# Patient Record
Sex: FEMALE | Race: WHITE
[De-identification: names, ages, dates, MRNs, and addresses within clinical notes are randomized per-mention and may not be internally consistent; named-entity substitution may affect disease eponyms.]

---

## 2019-08-19 ENCOUNTER — HOSPITAL ENCOUNTER (OUTPATIENT)
Age: 49
End: 2019-08-19
Payer: COMMERCIAL

## 2019-08-19 DIAGNOSIS — M47.896: Primary | ICD-10-CM

## 2019-08-19 DIAGNOSIS — M51.36: ICD-10-CM

## 2019-08-19 DIAGNOSIS — M48.061: ICD-10-CM

## 2019-08-19 PROCEDURE — 72110 X-RAY EXAM L-2 SPINE 4/>VWS: CPT

## 2019-09-25 ENCOUNTER — HOSPITAL ENCOUNTER (OUTPATIENT)
Age: 49
End: 2019-09-25
Payer: COMMERCIAL

## 2019-09-25 DIAGNOSIS — R13.10: Primary | ICD-10-CM

## 2019-09-25 PROCEDURE — 74220 X-RAY XM ESOPHAGUS 1CNTRST: CPT

## 2019-10-05 ENCOUNTER — HOSPITAL ENCOUNTER (EMERGENCY)
Dept: HOSPITAL 100 - ED | Age: 49
LOS: 1 days | Discharge: HOME | End: 2019-10-06
Payer: COMMERCIAL

## 2019-10-05 VITALS
HEART RATE: 95 BPM | TEMPERATURE: 98.06 F | OXYGEN SATURATION: 97 % | DIASTOLIC BLOOD PRESSURE: 101 MMHG | RESPIRATION RATE: 15 BRPM | SYSTOLIC BLOOD PRESSURE: 152 MMHG

## 2019-10-05 VITALS — BODY MASS INDEX: 25.5 KG/M2

## 2019-10-05 DIAGNOSIS — H16.001: Primary | ICD-10-CM

## 2019-10-05 PROCEDURE — 70450 CT HEAD/BRAIN W/O DYE: CPT

## 2019-10-05 PROCEDURE — 99282 EMERGENCY DEPT VISIT SF MDM: CPT

## 2019-10-05 RX ADMIN — TETRACAINE HYDROCHLORIDE 1 DRP: 5 SOLUTION OPHTHALMIC at 23:20

## 2019-10-06 VITALS — RESPIRATION RATE: 14 BRPM

## 2019-10-06 RX ADMIN — HYDROCODONE BITARTRATE AND ACETAMINOPHEN 1 TABLET: 5; 325 TABLET ORAL at 00:04

## 2019-10-07 ENCOUNTER — HOSPITAL ENCOUNTER (OUTPATIENT)
Age: 49
End: 2019-10-07
Payer: COMMERCIAL

## 2019-10-07 VITALS — BODY MASS INDEX: 25.5 KG/M2

## 2019-10-07 DIAGNOSIS — R68.89: ICD-10-CM

## 2019-10-07 DIAGNOSIS — I10: Primary | ICD-10-CM

## 2019-10-07 LAB
ALANINE AMINOTRANSFER ALT/SGPT: 28 U/L (ref 13–56)
ALBUMIN SERPL-MCNC: 4.5 G/DL (ref 3.2–5)
ALKALINE PHOSPHATASE: 65 U/L (ref 45–117)
ANION GAP: 8 (ref 5–15)
AST(SGOT): 15 U/L (ref 15–37)
BUN SERPL-MCNC: 18 MG/DL (ref 7–18)
BUN/CREAT RATIO: 25.7 RATIO (ref 10–20)
CALCIUM SERPL-MCNC: 9.3 MG/DL (ref 8.5–10.1)
CARBON DIOXIDE: 29 MMOL/L (ref 21–32)
CHLORIDE: 103 MMOL/L (ref 98–107)
CHOLEST SERPL-MCNC: 250 MG/DL
EST GLOM FILT RATE - AFR AMER: 113 ML/MIN (ref 60–?)
GLOBULIN: 3.8 G/DL (ref 2.2–4.2)
GLUCOSE: 87 MG/DL (ref 74–106)
POTASSIUM: 4.7 MMOL/L (ref 3.5–5.1)
TRIGLYCERIDES: 82 MG/DL
VLDLC SERPL-MCNC: 16 MG/DL (ref 5–40)

## 2019-10-07 PROCEDURE — 36415 COLL VENOUS BLD VENIPUNCTURE: CPT

## 2019-10-07 PROCEDURE — 80061 LIPID PANEL: CPT

## 2019-10-07 PROCEDURE — 84443 ASSAY THYROID STIM HORMONE: CPT

## 2019-10-07 PROCEDURE — 80053 COMPREHEN METABOLIC PANEL: CPT

## 2019-10-25 ENCOUNTER — HOSPITAL ENCOUNTER (OUTPATIENT)
Dept: HOSPITAL 100 - PT | Age: 49
Discharge: HOME | End: 2019-10-25
Payer: COMMERCIAL

## 2019-10-25 VITALS — BODY MASS INDEX: 25.5 KG/M2

## 2019-10-25 DIAGNOSIS — M54.5: Primary | ICD-10-CM

## 2019-10-25 PROCEDURE — G0283 ELEC STIM OTHER THAN WOUND: HCPCS

## 2019-10-25 PROCEDURE — 97014 ELECTRIC STIMULATION THERAPY: CPT

## 2019-10-25 PROCEDURE — 97162 PT EVAL MOD COMPLEX 30 MIN: CPT

## 2019-10-25 PROCEDURE — 97110 THERAPEUTIC EXERCISES: CPT

## 2019-10-25 PROCEDURE — 97035 APP MDLTY 1+ULTRASOUND EA 15: CPT

## 2019-11-25 ENCOUNTER — HOSPITAL ENCOUNTER (OUTPATIENT)
Age: 49
End: 2019-11-25
Payer: COMMERCIAL

## 2019-11-25 VITALS — BODY MASS INDEX: 25.5 KG/M2

## 2019-11-25 DIAGNOSIS — M25.552: ICD-10-CM

## 2019-11-25 DIAGNOSIS — I10: Primary | ICD-10-CM

## 2019-11-25 LAB
ANION GAP: 5 (ref 5–15)
BUN SERPL-MCNC: 12 MG/DL (ref 7–18)
BUN/CREAT RATIO: 16.5 RATIO (ref 10–20)
CALCIUM SERPL-MCNC: 8.9 MG/DL (ref 8.5–10.1)
CARBON DIOXIDE: 31 MMOL/L (ref 21–32)
CHLORIDE: 101 MMOL/L (ref 98–107)
CHOLEST SERPL-MCNC: 223 MG/DL
EST GLOM FILT RATE - AFR AMER: 109 ML/MIN (ref 60–?)
GLUCOSE: 81 MG/DL (ref 74–106)
POTASSIUM: 3.9 MMOL/L (ref 3.5–5.1)
TRIGLYCERIDES: 98 MG/DL
VLDLC SERPL-MCNC: 20 MG/DL (ref 5–40)

## 2019-11-25 PROCEDURE — 80048 BASIC METABOLIC PNL TOTAL CA: CPT

## 2019-11-25 PROCEDURE — 36415 COLL VENOUS BLD VENIPUNCTURE: CPT

## 2019-11-25 PROCEDURE — 80061 LIPID PANEL: CPT

## 2019-11-25 PROCEDURE — 73521 X-RAY EXAM HIPS BI 2 VIEWS: CPT

## 2019-12-13 ENCOUNTER — HOSPITAL ENCOUNTER (OUTPATIENT)
Age: 49
End: 2019-12-13
Payer: COMMERCIAL

## 2019-12-13 VITALS — BODY MASS INDEX: 25.5 KG/M2

## 2019-12-13 DIAGNOSIS — M70.62: Primary | ICD-10-CM

## 2019-12-13 PROCEDURE — 73721 MRI JNT OF LWR EXTRE W/O DYE: CPT

## 2019-12-30 ENCOUNTER — HOSPITAL ENCOUNTER (OUTPATIENT)
Dept: HOSPITAL 100 - PT | Age: 49
Discharge: HOME | End: 2019-12-30
Payer: COMMERCIAL

## 2019-12-30 VITALS — BODY MASS INDEX: 25.5 KG/M2

## 2019-12-30 DIAGNOSIS — M76.12: ICD-10-CM

## 2019-12-30 DIAGNOSIS — M70.62: Primary | ICD-10-CM

## 2019-12-30 PROCEDURE — G0283 ELEC STIM OTHER THAN WOUND: HCPCS

## 2019-12-30 PROCEDURE — 97162 PT EVAL MOD COMPLEX 30 MIN: CPT

## 2019-12-30 PROCEDURE — 97530 THERAPEUTIC ACTIVITIES: CPT

## 2019-12-30 PROCEDURE — 97110 THERAPEUTIC EXERCISES: CPT

## 2019-12-30 PROCEDURE — 97014 ELECTRIC STIMULATION THERAPY: CPT

## 2020-02-19 ENCOUNTER — HOSPITAL ENCOUNTER (OUTPATIENT)
Age: 50
End: 2020-02-19
Payer: COMMERCIAL

## 2020-02-19 VITALS — BODY MASS INDEX: 25.5 KG/M2

## 2020-02-19 DIAGNOSIS — E55.9: ICD-10-CM

## 2020-02-19 DIAGNOSIS — I10: Primary | ICD-10-CM

## 2020-02-19 LAB
ANION GAP: 6 (ref 5–15)
BUN SERPL-MCNC: 14 MG/DL (ref 7–18)
BUN/CREAT RATIO: 17.5 RATIO (ref 10–20)
CALCIUM SERPL-MCNC: 9.7 MG/DL (ref 8.5–10.1)
CARBON DIOXIDE: 28 MMOL/L (ref 21–32)
CHLORIDE: 103 MMOL/L (ref 98–107)
CHOLEST SERPL-MCNC: 257 MG/DL
EST GLOM FILT RATE - AFR AMER: 97 ML/MIN (ref 60–?)
GLUCOSE: 88 MG/DL (ref 74–106)
POTASSIUM: 3.7 MMOL/L (ref 3.5–5.1)
TRIGLYCERIDES: 231 MG/DL
VITAMIN D,25 HYDROXY: 33.9 NG/ML (ref 29.95–100.01)
VLDLC SERPL-MCNC: 46 MG/DL (ref 5–40)

## 2020-02-19 PROCEDURE — 80048 BASIC METABOLIC PNL TOTAL CA: CPT

## 2020-02-19 PROCEDURE — 80061 LIPID PANEL: CPT

## 2020-02-19 PROCEDURE — 36415 COLL VENOUS BLD VENIPUNCTURE: CPT

## 2020-02-19 PROCEDURE — 82306 VITAMIN D 25 HYDROXY: CPT

## 2020-02-25 ENCOUNTER — HOSPITAL ENCOUNTER (OUTPATIENT)
Age: 50
End: 2020-02-25
Payer: COMMERCIAL

## 2020-02-25 VITALS — BODY MASS INDEX: 25.5 KG/M2

## 2020-02-25 DIAGNOSIS — Z12.31: Primary | ICD-10-CM

## 2020-02-25 PROCEDURE — 77067 SCR MAMMO BI INCL CAD: CPT

## 2020-02-26 ENCOUNTER — HOSPITAL ENCOUNTER (OUTPATIENT)
Dept: HOSPITAL 100 - LABSPEC | Age: 50
Discharge: HOME | End: 2020-02-26
Payer: COMMERCIAL

## 2020-02-26 VITALS — BODY MASS INDEX: 25.5 KG/M2

## 2020-02-26 DIAGNOSIS — Z01.419: Primary | ICD-10-CM

## 2020-02-26 PROCEDURE — 88175 CYTOPATH C/V AUTO FLUID REDO: CPT

## 2020-02-26 PROCEDURE — G0145 SCR C/V CYTO,THINLAYER,RESCR: HCPCS

## 2020-07-07 ENCOUNTER — HOSPITAL ENCOUNTER (OUTPATIENT)
Age: 50
End: 2020-07-07
Payer: COMMERCIAL

## 2020-07-07 VITALS — BODY MASS INDEX: 25.5 KG/M2

## 2020-07-07 DIAGNOSIS — M51.36: Primary | ICD-10-CM

## 2020-07-07 PROCEDURE — 72148 MRI LUMBAR SPINE W/O DYE: CPT

## 2021-04-30 ENCOUNTER — HOSPITAL ENCOUNTER (OUTPATIENT)
Age: 51
End: 2021-04-30
Payer: COMMERCIAL

## 2021-04-30 VITALS — BODY MASS INDEX: 25.5 KG/M2

## 2021-04-30 DIAGNOSIS — Z00.00: Primary | ICD-10-CM

## 2021-04-30 LAB
ANION GAP: 5 (ref 5–15)
BUN SERPL-MCNC: 12 MG/DL (ref 7–18)
BUN/CREAT RATIO: 17.8 RATIO (ref 10–20)
CALCIUM SERPL-MCNC: 9.8 MG/DL (ref 8.5–10.1)
CARBON DIOXIDE: 29 MMOL/L (ref 21–32)
CHLORIDE: 104 MMOL/L (ref 98–107)
EST GLOM FILT RATE - AFR AMER: 117 ML/MIN (ref 60–?)
GLUCOSE: 87 MG/DL (ref 74–106)
POTASSIUM: 4.3 MMOL/L (ref 3.5–5.1)
VITAMIN D,25 HYDROXY: 33.7 NG/ML

## 2021-04-30 PROCEDURE — 36415 COLL VENOUS BLD VENIPUNCTURE: CPT

## 2021-04-30 PROCEDURE — 80048 BASIC METABOLIC PNL TOTAL CA: CPT

## 2021-04-30 PROCEDURE — 82306 VITAMIN D 25 HYDROXY: CPT

## 2021-04-30 PROCEDURE — 84443 ASSAY THYROID STIM HORMONE: CPT

## 2021-12-08 ENCOUNTER — HOSPITAL ENCOUNTER (OUTPATIENT)
Age: 51
End: 2021-12-08
Payer: COMMERCIAL

## 2021-12-08 DIAGNOSIS — Z01.810: Primary | ICD-10-CM

## 2021-12-08 PROCEDURE — 71046 X-RAY EXAM CHEST 2 VIEWS: CPT

## 2022-01-04 ENCOUNTER — HOSPITAL ENCOUNTER (OUTPATIENT)
Dept: HOSPITAL 100 - LABSPEC | Age: 52
Discharge: ANOTHER HOSPITAL | End: 2022-01-04
Payer: COMMERCIAL

## 2022-01-04 DIAGNOSIS — Z11.52: Primary | ICD-10-CM

## 2022-01-04 PROCEDURE — U0005 INFEC AGEN DETEC AMPLI PROBE: HCPCS

## 2022-01-04 PROCEDURE — 87635 SARS-COV-2 COVID-19 AMP PRB: CPT

## 2022-01-04 PROCEDURE — U0003 INFECTIOUS AGENT DETECTION BY NUCLEIC ACID (DNA OR RNA); SEVERE ACUTE RESPIRATORY SYNDROME CORONAVIRUS 2 (SARS-COV-2) (CORONAVIRUS DISEASE [COVID-19]), AMPLIFIED PROBE TECHNIQUE, MAKING USE OF HIGH THROUGHPUT TECHNOLOGIES AS DESCRIBED BY CMS-2020-01-R: HCPCS

## 2022-05-10 ENCOUNTER — HOSPITAL ENCOUNTER (OUTPATIENT)
Dept: HOSPITAL 100 - MTLAB | Age: 52
Discharge: HOME | End: 2022-05-10
Payer: COMMERCIAL

## 2022-05-10 DIAGNOSIS — Z00.00: Primary | ICD-10-CM

## 2022-05-10 LAB
ANION GAP: 9 (ref 5–15)
BUN SERPL-MCNC: 16 MG/DL (ref 7–18)
BUN/CREAT RATIO: 25.4 RATIO (ref 10–20)
CALCIUM SERPL-MCNC: 9.2 MG/DL (ref 8.5–10.1)
CARBON DIOXIDE: 28 MMOL/L (ref 21–32)
CHLORIDE: 102 MMOL/L (ref 98–107)
CHOLEST SERPL-MCNC: 275 MG/DL
EST GLOM FILT RATE - AFR AMER: 127 ML/MIN (ref 60–?)
GLUCOSE: 90 MG/DL (ref 74–106)
POTASSIUM: 4 MMOL/L (ref 3.5–5.1)
TRIGLYCERIDES: 127 MG/DL
VITAMIN D,25 HYDROXY: 37.6 NG/ML
VLDLC SERPL-MCNC: 25 MG/DL (ref 5–40)

## 2022-05-10 PROCEDURE — 80061 LIPID PANEL: CPT

## 2022-05-10 PROCEDURE — 82306 VITAMIN D 25 HYDROXY: CPT

## 2022-05-10 PROCEDURE — 80048 BASIC METABOLIC PNL TOTAL CA: CPT

## 2022-05-10 PROCEDURE — 36415 COLL VENOUS BLD VENIPUNCTURE: CPT

## 2022-05-17 ENCOUNTER — HOSPITAL ENCOUNTER (OUTPATIENT)
Dept: HOSPITAL 100 - OPBI | Age: 52
Discharge: HOME | End: 2022-05-17
Payer: COMMERCIAL

## 2022-05-17 DIAGNOSIS — Z12.31: Primary | ICD-10-CM

## 2022-05-17 PROCEDURE — 77063 BREAST TOMOSYNTHESIS BI: CPT

## 2022-05-17 PROCEDURE — 77067 SCR MAMMO BI INCL CAD: CPT

## 2022-09-13 ENCOUNTER — HOSPITAL ENCOUNTER (OUTPATIENT)
Dept: HOSPITAL 100 - MTRAD | Age: 52
Discharge: HOME | End: 2022-09-13
Payer: COMMERCIAL

## 2022-09-13 DIAGNOSIS — M25.551: Primary | ICD-10-CM

## 2022-09-13 PROCEDURE — 73502 X-RAY EXAM HIP UNI 2-3 VIEWS: CPT

## 2023-03-14 ENCOUNTER — HOSPITAL ENCOUNTER (OUTPATIENT)
Dept: HOSPITAL 100 - MTLAB | Age: 53
Discharge: HOME | End: 2023-03-14
Payer: COMMERCIAL

## 2023-03-14 DIAGNOSIS — N95.1: ICD-10-CM

## 2023-03-14 DIAGNOSIS — I10: Primary | ICD-10-CM

## 2023-03-14 DIAGNOSIS — R63.5: ICD-10-CM

## 2023-03-14 LAB
ANION GAP: 8 (ref 5–15)
BUN SERPL-MCNC: 15 MG/DL (ref 7–18)
BUN/CREAT RATIO: 20.1 RATIO (ref 10–20)
CALCIUM SERPL-MCNC: 9.9 MG/DL (ref 8.5–10.1)
CARBON DIOXIDE: 28 MMOL/L (ref 21–32)
CHLORIDE: 101 MMOL/L (ref 98–107)
CHOLEST SERPL-MCNC: 205 MG/DL
EST GLOM FILT RATE - AFR AMER: 104 ML/MIN (ref 60–?)
FOLLICLE STIMULATING HORMONE: 61.8 MIU/ML
GLUCOSE: 100 MG/DL (ref 74–106)
POTASSIUM: 3.8 MMOL/L (ref 3.5–5.1)
TRIGLYCERIDES: 151 MG/DL
VLDLC SERPL-MCNC: 30 MG/DL (ref 5–40)

## 2023-03-14 PROCEDURE — 80061 LIPID PANEL: CPT

## 2023-03-14 PROCEDURE — 36415 COLL VENOUS BLD VENIPUNCTURE: CPT

## 2023-03-14 PROCEDURE — 84443 ASSAY THYROID STIM HORMONE: CPT

## 2023-03-14 PROCEDURE — 83002 ASSAY OF GONADOTROPIN (LH): CPT

## 2023-03-14 PROCEDURE — 83001 ASSAY OF GONADOTROPIN (FSH): CPT

## 2023-03-14 PROCEDURE — 80048 BASIC METABOLIC PNL TOTAL CA: CPT

## 2023-03-14 PROCEDURE — 82672 ASSAY OF ESTROGEN: CPT

## 2023-03-19 LAB — ESTROGEN SERPL-MCNC: 57 PG/ML (ref 40–244)

## 2023-04-10 ENCOUNTER — HOSPITAL ENCOUNTER (OUTPATIENT)
Age: 53
Discharge: HOME | End: 2023-04-10
Payer: COMMERCIAL

## 2023-04-10 DIAGNOSIS — M25.551: Primary | ICD-10-CM

## 2023-04-10 LAB — CRP SERPL-MCNC: < 2.9 MG/L (ref 0–3)

## 2023-04-10 PROCEDURE — 86038 ANTINUCLEAR ANTIBODIES: CPT

## 2023-04-10 PROCEDURE — 73502 X-RAY EXAM HIP UNI 2-3 VIEWS: CPT

## 2023-04-10 PROCEDURE — 85652 RBC SED RATE AUTOMATED: CPT

## 2023-04-10 PROCEDURE — 86431 RHEUMATOID FACTOR QUANT: CPT

## 2023-04-10 PROCEDURE — 86140 C-REACTIVE PROTEIN: CPT

## 2023-04-10 PROCEDURE — 86618 LYME DISEASE ANTIBODY: CPT

## 2023-04-10 PROCEDURE — 36415 COLL VENOUS BLD VENIPUNCTURE: CPT

## 2023-12-22 ENCOUNTER — LAB (OUTPATIENT)
Dept: LAB | Facility: LAB | Age: 53
End: 2023-12-22
Payer: COMMERCIAL

## 2023-12-22 DIAGNOSIS — I10 ESSENTIAL (PRIMARY) HYPERTENSION: Primary | ICD-10-CM

## 2023-12-22 LAB
ANION GAP SERPL CALC-SCNC: 11 MMOL/L (ref 10–20)
BUN SERPL-MCNC: 12 MG/DL (ref 6–23)
CALCIUM SERPL-MCNC: 9.5 MG/DL (ref 8.6–10.3)
CHLORIDE SERPL-SCNC: 103 MMOL/L (ref 98–107)
CHOLEST SERPL-MCNC: 249 MG/DL (ref 0–199)
CHOLESTEROL/HDL RATIO: 3.3
CO2 SERPL-SCNC: 28 MMOL/L (ref 21–32)
CREAT SERPL-MCNC: 0.57 MG/DL (ref 0.5–1.05)
GFR SERPL CREATININE-BSD FRML MDRD: >90 ML/MIN/1.73M*2
GLUCOSE SERPL-MCNC: 92 MG/DL (ref 74–99)
HDLC SERPL-MCNC: 76 MG/DL
LDLC SERPL CALC-MCNC: 158 MG/DL
NON HDL CHOLESTEROL: 173 MG/DL (ref 0–149)
POTASSIUM SERPL-SCNC: 4.5 MMOL/L (ref 3.5–5.3)
SODIUM SERPL-SCNC: 137 MMOL/L (ref 136–145)
TRIGL SERPL-MCNC: 77 MG/DL (ref 0–149)
VLDL: 15 MG/DL (ref 0–40)

## 2023-12-22 PROCEDURE — 80048 BASIC METABOLIC PNL TOTAL CA: CPT

## 2023-12-22 PROCEDURE — 80061 LIPID PANEL: CPT

## 2023-12-22 PROCEDURE — 36415 COLL VENOUS BLD VENIPUNCTURE: CPT

## 2024-10-03 ENCOUNTER — HOSPITAL ENCOUNTER (OUTPATIENT)
Dept: HOSPITAL 100 - OPBD | Age: 54
Discharge: HOME | End: 2024-10-03
Payer: COMMERCIAL

## 2024-10-03 DIAGNOSIS — Z78.0: ICD-10-CM

## 2024-10-03 DIAGNOSIS — Z12.31: Primary | ICD-10-CM

## 2024-10-03 PROCEDURE — 77080 DXA BONE DENSITY AXIAL: CPT

## 2024-10-03 PROCEDURE — 77063 BREAST TOMOSYNTHESIS BI: CPT

## 2024-10-03 PROCEDURE — 77067 SCR MAMMO BI INCL CAD: CPT

## 2024-10-03 NOTE — BD_ITS
REPORT-ID:[id number] 
 
STUDY:  DUAL ENERGY X-RAY ABSORPTIOMETRY / DXA 
 
REASON FOR EXAM:   Female, 54 years old.  [id number] 
 
TECHNIQUE:   Bone Mineral Density (BMD) measurements of lumbar spine and 
left forearm were obtained. 
 
COMPARISON:   None. 
___________________________________ 
 
FINDINGS: 
 
Lumbar Spine (L1-L4):  g/cm2 (0.912)  /  T-score (-1.2)  /  Z-score (-0.1) 
Findings are suggestive of osteopenia with a low fracture risk. 
 
Left Forearm:  g/cm2 (0.632)  /  T-score (1.0)  /  Z-score (2.) 
 
___________________________________ 
 
ORDER #: [id number] BD/Dexa Bone Density Study  
IMPRESSION:  
The patient is considered osteopenic as outlined below according to World  
Satya Organization (WHO) criteria with a low fracture risk.  
 
  
___________________________________  
 
  
Reference Information:  
The T-score is the number of standard deviations above or below the  
standard which is normal for young adults at their peak bone mineral  
density. The World Health Organization (WHO) interprets the T-scores as  
follows:  
 
  
Above  -1          Normal bone density  
Between -1 and -2.5    Osteopenia  
Equal to / or below -2.5  Osteoporosis  
 
  
As a practical clinical guideline, osteopenia may be graded as follows:  
Mild -1 through -1.5  
Moderate -1.6  through -2.0  
Severe  -2.1  through -2.4  
 
  
The Z-score is the number of standard deviations above or below age-matched  
controls. A Z-score of less than -1.5 would be considered abnormal.  
 
  
References:  
1.  NIH Osteoporosis and Related Bone Diseases www osteo.org  
2.  International Society for Clinical Densitometry www iscd.org  
3.  National Osteoporosis Foundation www nof.org  
 
  
Electronically Signed:  
Gio Roque MD  
2024/10/04 at 12:40 EDT  
Reading Location ID and State: [address] / [place]  
Tel [phone], Service support  [phone], Fax [phone]

## 2024-10-03 NOTE — BI_ITS
REPORT-ID:[id number] 
 
MAMMOGRAPHY - BILATERAL SCREENING 
 
REASON FOR EXAM:    Female, 54 years old.  Routine annual screening 
examination. 
 
PERTINENT HISTORY:  Grandmother with breast cancer. Bilateral breast 
implants. Prior right excisional breast biopsy. 
 
TECHNIQUE:   Digital bilateral breast nancy (3D mammographic acquisition) in 
the CC and MLO projections. 2-D mediolateral oblique (MLO) and craniocaudad 
(CC) views of both breasts were obtained.  CAD: Full Field Digital 
Mammography with Computer Added Detection was performed. 
 
COMPARISON:   Comparison is made with prior study dated May 17, 2022. 
___________________________________ 
 
FINDINGS: 
Breast Composition:  There are scattered areas of fibroglandular density. 
 
There is a 2.4 cm x 2.3 cm spiculated nodule in the deep upper lateral 
aspect of the left breast. A neoplastic process should be ruled out. Biopsy 
recommended. Stable appearance of the bilateral breast implants. 
 
No other significant abnormalities are identified. 
___________________________________ 
 
ORDER #: [id number] BI/SCRN MAMM (CAD)W/NANCY BILAT  
IMPRESSION:  
2.4 cm x 2.3 cm spiculated nodule in the deep upper lateral aspect of the  
left breast. Biopsy recommended.  
 
  
___________________________________  
 
  
ASSESSMENT CATEGORY:  
BIRADS Category 4:  Suspicious - Biopsy Should Be Considered.  A letter  
regarding these results will be sent to the patient by the facility within  
30 days.  
 
  
Approximately 10% of breast cancers are not detected by mammography.  A  
normal mammogram should not delay biopsy of a clinically suspicious  
abnormality.  
 
  
[id number]  
 
  
Electronically Signed:  
Gio Roque MD  
2024/10/03 at 10:44 EDT  
Reading Location ID and State: [address] / [place]  
Tel [phone], Service support  [phone], Fax [phone]

## 2024-10-10 ENCOUNTER — HOSPITAL ENCOUNTER (OUTPATIENT)
Dept: HOSPITAL 100 - OPUS | Age: 54
Discharge: HOME | End: 2024-10-10
Payer: COMMERCIAL

## 2024-10-10 DIAGNOSIS — N63.20: Primary | ICD-10-CM

## 2024-10-10 PROCEDURE — 76642 ULTRASOUND BREAST LIMITED: CPT

## 2024-10-10 NOTE — XMS RPT_ITS
"  
                         Comprehensive CCD (C-CDA v2.1)  
  
                          Created on: October 10, 2024  
  
  
SALIMAMAYA  
External Reference #: CDR,PersonID:[id number]  
: 1970  
Sex: Female  
  
Demographics  
  
  
                                        Address             [address] [place] [address]  [address]  
   
                                        Home Phone          [phone]  
   
                                        Work Phone          [phone]  
   
                                        Work Phone          [phone]  
   
                                        Work Phone          [phone]  
   
                                        Preferred Language  en  
   
                                        Marital Status      Single  
   
                                        Jew Affiliation Unknown  
   
                                        Race                Unknown  
   
                                        Ethnic Group        Not  or Lati  
no  
  
  
Author  
  
  
                                        Organization        [facility]  
ion [facility]  
  
  
Care Team Providers  
  
  
                                Care Team Member Name Role            Phone  
   
                                FRANK RAZO II Unavailable     Unavailcarlee Renner MD, Giacomo CHILDERS Unavailable     [phone]  
   
                                ANDREA MONROE Attending       Unavailable  
   
                                ANDREA MONROE Referring       Unavailable  
   
                                ANDREA MONROE Attending       Unavailable  
  
  
  
Allergies  
  
  
                                                    Allergy   
Classification                          Reported   
Allergen(s)               Allergy Type              Date of   
Onset                     Reaction(s)               Facility  
   
                                                      
(3 sources)                             Latex;   
Translations:   
[LATEX]                                 Propensity to   
adverse   
reactions to   
drug (disorder)                           
0                         [facility]   
Repository  
   
                                                      
(1 source)                              ALLERGIES NOT ON   
FILE;   
Translations:   
[ALLERGIES NOT   
ON FILE]                                Propensity to   
adverse   
reactions   
(disorder)                                                  [facility]  
  
  
  
Medications  
Completed/Discontinued Medications  
  
  
  
                      Medication Drug Class(es) Dates      Sig (Normalized) Sig   
(Original)  
   
                                                    albuterol 0.83   
mg/ml inhalant   
solution  
(1 source)                              beta2-Adrenergic   
Agonist                                 Start:   
2020                                          ALBUTEROL SULFATE   
(2.5 MG/3ML)   
0.083% NEBU As   
needed as   
directed   
   
ALBUTEROL SULFATE   
[id number]   
Talya Pat  
   
                                                    amLODIPine 5 mg   
oral tablet  
(1 source)                              Dihydropyridine   
Calcium Channel   
Blocker                                 Start:   
2020                                          AMLODIPINE   
BESYLATE 5 MG   
TABS 1 tablet   
once daily   
   
AMLODIPINE   
BESYLATE   
[id number]   
Talya Pat  
   
                                                    24 hr buPROPion   
hydrochloride 150   
mg extended release   
oral tablet  
(1 source)                Am
[id number]|2024-10-10 14:37:00|2024-10-11 08:43:00|US_ITS||Imaging Services|[id number]|"REPORT-ID:[id number]

## 2024-10-11 ENCOUNTER — HOSPITAL ENCOUNTER (OUTPATIENT)
Dept: HOSPITAL 100 - LABSPEC | Age: 54
Discharge: HOME | End: 2024-10-11
Payer: COMMERCIAL

## 2024-10-11 DIAGNOSIS — R92.8: Primary | ICD-10-CM

## 2024-10-11 PROCEDURE — 88305 TISSUE EXAM BY PATHOLOGIST: CPT

## 2024-10-11 PROCEDURE — 88341 IMHCHEM/IMCYTCHM EA ADD ANTB: CPT

## 2024-10-11 PROCEDURE — 88342 IMHCHEM/IMCYTCHM 1ST ANTB: CPT

## 2024-10-11 NOTE — BRBX_PTH
PATHOLOGY RESULTS

PATIENT: MAYA BORREGO             ACCT #:[id number]  LOC: [facility]    U#:[id number]
                                       AGE/SX: 54/F         ROOM:           REG: 10/11/2024
REG DR: Dr. Marilynn Garrett MD        : 1970      BED:            DIS: 10/11/2024

--------------------------------------------------------------------------------------------

SPEC #: [id number]        RECD: 10/11/24 11:52    STATUS:  DMITRI        REALLEN #: [id number]
                        BARRY: 10/11/24 00:00    SUBM DR: Marilynn Garrett

DEPT: SURGICAL PATHOLOGY                        RECD BY: Emperatriz Olsen

ENTERED: 10/11/24 13:36 SP TYPE: BREAST BX  OTSTACY DR: Dr. Matthew Bradley MD

Tissues:    Left breast, NOS
Procedures: Surgery Specimen Level IV

--------------------------------------------------------------------------------------------

HEADER

OPERATION: Left breast mass biopsy  
PRE-OP DIAGNOSIS: Abnormal mammogram  
TISSUE SUBMITTED: Left breast mass tissue  
Ischemic Time:  1 minute        Fixation Time:  56 hours  

--------------------------------------------------------------------------------------------

MICROSCOPIC DIAGNOSIS

Left breast mass, core biopsy:  
    Invasive lobular carcinoma.  
    See cancer summary in the comment section.  
  
 10/15/2024 

--------------------------------------------------------------------------------------------

COMMENT

INVASIVE BREAST CANCER SUMMARY:   
Procedure: Needle core biopsy  
Specimen Laterality: Left   
Tumor site: Not specified  
Histologic type: Invasive lobular carcinoma  
Provisional Histologic grade:  
    Glandular/tubule Differentiation Score: 3  
    Nuclear Pleomorphism Score:1  
    Mitotic Rate Score:1  
    Overall grade: Grade 1 (score of 5)  
Tumor Size ( greatest dimension):  1.1cm in greatest length  
Ductal Carcinoma Insitu: Not identified  
Angiolymphatic Invasion:  
Microcalcifications: Not identified  
Additional Findings: None  
Breast Marker Study: [id number]  
    ER: positive (>95%, strong intensity)  
    WV: positive (variable, 0-15%, moderate intensity)  
    Her2: negative (1+)  
    Ki67: positive, low ~20%  
  
  
The above summary is in compliance with College of American Pathology (CAP) Cancer Protocols 
Checklist and American Joint Committee on Cancer (AJCC), Staging Manual, 8th Ed.  
  
  
Immunohistochemistry ([id number]) supports the above diagnosis.   
  
Case has been reviewed in consultation with Dr. Mast who concurs with the above diagnosis.  
IDC:AM  

--------------------------------------------------------------------------------------------

MICROSCOPIC DESCRIPTION

Slides are reviewed.  

--------------------------------------------------------------------------------------------

GROSS DESCRIPTION

Received in fixative is one container labeled with the patient's name and designated  Left 
breast tissue.   The specimen consists of multiple elongated fragments of tan tissue 
measuring in aggregate 1.5 x 0.2 x 0.1cm. The specimen is submitted in its entirety in one 
cassette.  10/11/2024 TC:0  
CPT:[id number]

## 2024-10-18 ENCOUNTER — HOSPITAL ENCOUNTER (OUTPATIENT)
Age: 54
Discharge: HOME | End: 2024-10-18
Payer: COMMERCIAL

## 2024-10-18 DIAGNOSIS — C50.919: Primary | ICD-10-CM

## 2024-10-18 PROCEDURE — C8908 MRI W/O FOL W/CONT, BREAST,: HCPCS

## 2024-10-18 PROCEDURE — A9575 INJ GADOTERATE MEGLUMI 0.1ML: HCPCS

## 2024-10-18 PROCEDURE — A4216 STERILE WATER/SALINE, 10 ML: HCPCS

## 2024-10-18 PROCEDURE — 77049 MRI BREAST C-+ W/CAD BI: CPT

## 2024-10-18 NOTE — MRI_ITS
REPORT-ID:[id number] 
 
STUDY:   BILATERAL BREAST MR WITHOUT AND WITH CONTRAST 
 
REASON FOR EXAM:   Female, 54 years old.   Left breast malignancy. Patient 
has implants. 
 
TECHNIQUE:   Multi-sequence multi-echo imaging of both breasts was 
performed with a dedicated breast coil.  T1-weighted and T2-weighted images 
were performed before the administration of contrast.  T1-weighted images 
were also performed after the intravenous administration of 17 cc of 
Clariscan contrast. 
 
COMPARISON:    Left breast ultrasound dated October 10, 2024 and bilateral 
mammogram dated October 3, 2024. 
___________________________________ 
 
FINDINGS: 
 
RIGHT BREAST: 
 
Scattered fibroglandular densities with minimal background enhancement. 
 
Subpectoral implant with no complicating features. 
 
No abnormal enhancing masses or areas of non-mass enhancement in the right 
breast. 
 
LEFT BREAST: 
 
Scattered fibroglandular densities with minimal background enhancement. 
 
Subpectoral implant with no complications identified. 
 
At the 3:00 position of the left breast there is an irregular enhancing 
mass measuring 4 cm x 1.8 cm x 2 cm adjacent to the posterior and lateral 
aspect of the implant. This represents the index lesion which was seen on 
mammogram and ultrasound. 
 
Enlarged left axillary lymph noted measuring approximately 2.4 cm in 
diameter. 
 
No abnormality in the visualized regions of the chest or liver. 
___________________________________ 
 
ORDER #: [id number] MRI/Breast Bilateral W/O and W  
IMPRESSION:  
Left breast mass measuring 4 cm x 1.8 cm x 2 cm representing the index  
lesion.  
 
  
Enlarged left axillary lymph node measuring 2.4 cm in diameter..  
 
  
___________________________________  
 
  
CATEGORY:  
BIRADS Category 6:  Known Biopsy-Proven Malignancy - Appropriate Action  
Should Be Taken.  A letter regarding these results will be sent to the  
patient by the facility within 30 days.  
 
  
Electronically Signed:  
Jarad Liang MD  
2024/10/21 at 8:22 EDT  
Reading Location ID and State: [address] / [place]  
Tel [phone], Service support  [phone], Fax [phone]